# Patient Record
Sex: MALE | Race: OTHER | NOT HISPANIC OR LATINO | Employment: FULL TIME | URBAN - METROPOLITAN AREA
[De-identification: names, ages, dates, MRNs, and addresses within clinical notes are randomized per-mention and may not be internally consistent; named-entity substitution may affect disease eponyms.]

---

## 2018-05-14 ENCOUNTER — HISTORICAL (OUTPATIENT)
Dept: ADMINISTRATIVE | Facility: HOSPITAL | Age: 33
End: 2018-05-14

## 2018-05-14 LAB
ABS NEUT (OLG): 3.5
ALBUMIN SERPL-MCNC: 4.8 GM/DL (ref 3.4–5)
ALBUMIN/GLOB SERPL: 2 {RATIO} (ref 1.5–2.5)
ALP SERPL-CCNC: 68 UNIT/L (ref 38–126)
ALT SERPL-CCNC: 22 UNIT/L (ref 7–52)
AST SERPL-CCNC: 24 UNIT/L (ref 15–37)
BILIRUB SERPL-MCNC: 1 MG/DL (ref 0.2–1)
BILIRUBIN DIRECT+TOT PNL SERPL-MCNC: 0.2 MG/DL (ref 0–0.5)
BILIRUBIN DIRECT+TOT PNL SERPL-MCNC: 0.8 MG/DL
BUN SERPL-MCNC: 11 MG/DL (ref 7–18)
CALCIUM SERPL-MCNC: 9.2 MG/DL (ref 8.5–10)
CHLORIDE SERPL-SCNC: 101 MMOL/L (ref 98–107)
CHOLEST SERPL-MCNC: 203 MG/DL (ref 0–200)
CHOLEST/HDLC SERPL: 6.8 {RATIO}
CO2 SERPL-SCNC: 27 MMOL/L (ref 21–32)
CREAT SERPL-MCNC: 0.88 MG/DL (ref 0.6–1.3)
ERYTHROCYTE [DISTWIDTH] IN BLOOD BY AUTOMATED COUNT: 13.4 % (ref 11.5–17)
GLOBULIN SER-MCNC: 2.4 GM/DL (ref 1.2–3)
GLUCOSE SERPL-MCNC: 88 MG/DL (ref 74–106)
HCT VFR BLD AUTO: 43 % (ref 42–52)
HDLC SERPL-MCNC: 30 MG/DL (ref 35–60)
HGB BLD-MCNC: 15 GM/DL (ref 14–18)
LDLC SERPL CALC-MCNC: 131 MG/DL (ref 0–129)
LYMPHOCYTES # BLD AUTO: 2.9 X10(3)/MCL (ref 0.6–3.4)
LYMPHOCYTES NFR BLD AUTO: 41.2 % (ref 13–40)
MCH RBC QN AUTO: 30.1 PG (ref 27–31.2)
MCHC RBC AUTO-ENTMCNC: 35 GM/DL (ref 32–36)
MCV RBC AUTO: 86 FL (ref 80–94)
MONOCYTES # BLD AUTO: 0.6 X10(3)/MCL (ref 0–1.8)
MONOCYTES NFR BLD AUTO: 8.7 % (ref 0.1–24)
NEUTROPHILS NFR BLD AUTO: 50.1 % (ref 47–80)
PLATELET # BLD AUTO: 236 X10(3)/MCL (ref 130–400)
PMV BLD AUTO: 11.2 FL
POTASSIUM SERPL-SCNC: 4.4 MMOL/L (ref 3.5–5.1)
PROT SERPL-MCNC: 7.2 GM/DL (ref 6.4–8.2)
RBC # BLD AUTO: 4.98 X10(6)/MCL (ref 4.7–6.1)
SODIUM SERPL-SCNC: 135 MMOL/L (ref 136–145)
TRIGL SERPL-MCNC: 224 MG/DL (ref 30–150)
VLDLC SERPL CALC-MCNC: 44.8 MG/DL
WBC # SPEC AUTO: 7 X10(3)/MCL (ref 4.5–11.5)

## 2019-05-20 ENCOUNTER — HISTORICAL (OUTPATIENT)
Dept: ADMINISTRATIVE | Facility: HOSPITAL | Age: 34
End: 2019-05-20

## 2019-05-20 LAB
ABS NEUT (OLG): 3.1 X10(3)/MCL (ref 2.1–9.2)
ALBUMIN SERPL-MCNC: 4.4 GM/DL (ref 3.4–5)
ALBUMIN/GLOB SERPL: 2.1 {RATIO} (ref 1.5–2.5)
ALP SERPL-CCNC: 69 UNIT/L (ref 38–126)
ALT SERPL-CCNC: 29 UNIT/L (ref 7–52)
AST SERPL-CCNC: 22 UNIT/L (ref 15–37)
BILIRUB SERPL-MCNC: 0.6 MG/DL (ref 0.2–1)
BILIRUBIN DIRECT+TOT PNL SERPL-MCNC: 0.1 MG/DL (ref 0–0.5)
BILIRUBIN DIRECT+TOT PNL SERPL-MCNC: 0.5 MG/DL
BUN SERPL-MCNC: 9 MG/DL (ref 7–18)
CALCIUM SERPL-MCNC: 9 MG/DL (ref 8.5–10)
CHLORIDE SERPL-SCNC: 103 MMOL/L (ref 98–107)
CHOLEST SERPL-MCNC: 198 MG/DL (ref 0–200)
CHOLEST/HDLC SERPL: 6.6 {RATIO}
CO2 SERPL-SCNC: 24 MMOL/L (ref 21–32)
CREAT SERPL-MCNC: 0.8 MG/DL (ref 0.6–1.3)
DEPRECATED CALCIDIOL+CALCIFEROL SERPL-MC: 14.1 NG/ML (ref 30–80)
ERYTHROCYTE [DISTWIDTH] IN BLOOD BY AUTOMATED COUNT: 13.2 % (ref 11.5–17)
GLOBULIN SER-MCNC: 2.1 GM/DL (ref 1.2–3)
GLUCOSE SERPL-MCNC: 89 MG/DL (ref 74–106)
HCT VFR BLD AUTO: 42.7 % (ref 42–52)
HDLC SERPL-MCNC: 30 MG/DL (ref 35–60)
HGB BLD-MCNC: 15 GM/DL (ref 14–18)
LDLC SERPL CALC-MCNC: 135 MG/DL (ref 0–129)
LYMPHOCYTES # BLD AUTO: 2.2 X10(3)/MCL (ref 0.6–3.4)
LYMPHOCYTES NFR BLD AUTO: 36.5 % (ref 13–40)
MCH RBC QN AUTO: 29.6 PG (ref 27–31.2)
MCHC RBC AUTO-ENTMCNC: 35 GM/DL (ref 32–36)
MCV RBC AUTO: 84 FL (ref 80–94)
MONOCYTES # BLD AUTO: 0.6 X10(3)/MCL (ref 0.1–1.3)
MONOCYTES NFR BLD AUTO: 10.5 % (ref 0.1–24)
NEUTROPHILS NFR BLD AUTO: 53 % (ref 47–80)
PLATELET # BLD AUTO: 215 X10(3)/MCL (ref 130–400)
PMV BLD AUTO: 12.3 FL (ref 9.4–12.4)
POTASSIUM SERPL-SCNC: 4.8 MMOL/L (ref 3.5–5.1)
PROT SERPL-MCNC: 6.5 GM/DL (ref 6.4–8.2)
RBC # BLD AUTO: 5.06 X10(6)/MCL (ref 4.7–6.1)
SODIUM SERPL-SCNC: 136 MMOL/L (ref 136–145)
TRIGL SERPL-MCNC: 205 MG/DL (ref 30–150)
VLDLC SERPL CALC-MCNC: 41 MG/DL
WBC # SPEC AUTO: 5.9 X10(3)/MCL (ref 4.5–11.5)

## 2020-06-30 ENCOUNTER — HISTORICAL (OUTPATIENT)
Dept: ADMINISTRATIVE | Facility: HOSPITAL | Age: 35
End: 2020-06-30

## 2020-06-30 LAB
ABS NEUT (OLG): 3.3 X10(3)/MCL (ref 2.1–9.2)
ALBUMIN SERPL-MCNC: 4.8 GM/DL (ref 3.4–5)
ALBUMIN/GLOB SERPL: 1.85 {RATIO} (ref 1.5–2.5)
ALP SERPL-CCNC: 72 UNIT/L (ref 38–126)
ALT SERPL-CCNC: 24 UNIT/L (ref 7–52)
AST SERPL-CCNC: 26 UNIT/L (ref 15–37)
BILIRUB SERPL-MCNC: 0.8 MG/DL (ref 0.2–1)
BILIRUBIN DIRECT+TOT PNL SERPL-MCNC: 0.1 MG/DL (ref 0–0.5)
BILIRUBIN DIRECT+TOT PNL SERPL-MCNC: 0.7 MG/DL
BUN SERPL-MCNC: 9 MG/DL (ref 7–18)
CALCIUM SERPL-MCNC: 9.6 MG/DL (ref 8.5–10)
CHLORIDE SERPL-SCNC: 101 MMOL/L (ref 98–107)
CHOLEST SERPL-MCNC: 216 MG/DL (ref 0–200)
CHOLEST/HDLC SERPL: 7.7 {RATIO}
CO2 SERPL-SCNC: 28 MMOL/L (ref 21–32)
CREAT SERPL-MCNC: 0.89 MG/DL (ref 0.6–1.3)
DEPRECATED CALCIDIOL+CALCIFEROL SERPL-MC: 12.4 NG/ML (ref 30–80)
ERYTHROCYTE [DISTWIDTH] IN BLOOD BY AUTOMATED COUNT: 13.4 % (ref 11.5–17)
GLOBULIN SER-MCNC: 2.6 GM/DL (ref 1.2–3)
GLUCOSE SERPL-MCNC: 83 MG/DL (ref 74–106)
HCT VFR BLD AUTO: 42.3 % (ref 42–52)
HDLC SERPL-MCNC: 28 MG/DL (ref 35–60)
HGB BLD-MCNC: 14.7 GM/DL (ref 14–18)
LDLC SERPL CALC-MCNC: 143 MG/DL (ref 0–129)
LYMPHOCYTES # BLD AUTO: 2.5 X10(3)/MCL (ref 0.6–3.4)
LYMPHOCYTES NFR BLD AUTO: 38.7 % (ref 13–40)
MCH RBC QN AUTO: 29.8 PG (ref 27–31.2)
MCHC RBC AUTO-ENTMCNC: 35 GM/DL (ref 32–36)
MCV RBC AUTO: 86 FL (ref 80–94)
MONOCYTES # BLD AUTO: 0.6 X10(3)/MCL (ref 0.1–1.3)
MONOCYTES NFR BLD AUTO: 9.1 % (ref 0.1–24)
NEUTROPHILS NFR BLD AUTO: 52.2 % (ref 47–80)
PLATELET # BLD AUTO: 243 X10(3)/MCL (ref 130–400)
PMV BLD AUTO: 12 FL (ref 9.4–12.4)
POTASSIUM SERPL-SCNC: 4.6 MMOL/L (ref 3.5–5.1)
PROT SERPL-MCNC: 7.4 GM/DL (ref 6.4–8.2)
RBC # BLD AUTO: 4.93 X10(6)/MCL (ref 4.7–6.1)
SODIUM SERPL-SCNC: 137 MMOL/L (ref 136–145)
TRIGL SERPL-MCNC: 218 MG/DL (ref 30–150)
VIT B12 SERPL-MCNC: 273 PG/ML (ref 213–816)
VLDLC SERPL CALC-MCNC: 43.6 MG/DL
WBC # SPEC AUTO: 6.4 X10(3)/MCL (ref 4.5–11.5)

## 2021-07-08 ENCOUNTER — HISTORICAL (OUTPATIENT)
Dept: ADMINISTRATIVE | Facility: HOSPITAL | Age: 36
End: 2021-07-08

## 2021-07-08 LAB
ABS NEUT (OLG): 3.4 X10(3)/MCL (ref 2.1–9.2)
ALBUMIN SERPL-MCNC: 4.7 GM/DL (ref 3.4–5)
ALBUMIN/GLOB SERPL: 1.96 {RATIO} (ref 1.5–2.5)
ALP SERPL-CCNC: 80 UNIT/L (ref 38–126)
ALT SERPL-CCNC: 16 UNIT/L (ref 7–52)
AST SERPL-CCNC: 19 UNIT/L (ref 15–37)
BILIRUB SERPL-MCNC: 0.8 MG/DL (ref 0.2–1)
BILIRUBIN DIRECT+TOT PNL SERPL-MCNC: 0.2 MG/DL (ref 0–0.5)
BILIRUBIN DIRECT+TOT PNL SERPL-MCNC: 0.6 MG/DL
BUN SERPL-MCNC: 11 MG/DL (ref 7–18)
CALCIUM SERPL-MCNC: 9.3 MG/DL (ref 8.5–10.1)
CHLORIDE SERPL-SCNC: 102 MMOL/L (ref 98–107)
CHOLEST SERPL-MCNC: 206 MG/DL (ref 0–200)
CHOLEST/HDLC SERPL: 6.9 {RATIO}
CO2 SERPL-SCNC: 25 MMOL/L (ref 21–32)
CREAT SERPL-MCNC: 0.86 MG/DL (ref 0.6–1.3)
DEPRECATED CALCIDIOL+CALCIFEROL SERPL-MC: 11.6 NG/ML (ref 30–80)
ERYTHROCYTE [DISTWIDTH] IN BLOOD BY AUTOMATED COUNT: 13.9 % (ref 11.5–17)
GLOBULIN SER-MCNC: 2.4 GM/DL (ref 1.2–3)
GLUCOSE SERPL-MCNC: 90 MG/DL (ref 74–106)
HCT VFR BLD AUTO: 41.2 % (ref 42–52)
HDLC SERPL-MCNC: 30 MG/DL (ref 35–60)
HGB BLD-MCNC: 14.3 GM/DL (ref 14–18)
LDLC SERPL CALC-MCNC: 144 MG/DL (ref 0–129)
LYMPHOCYTES # BLD AUTO: 2.3 X10(3)/MCL (ref 0.6–3.4)
LYMPHOCYTES NFR BLD AUTO: 36.3 % (ref 13–40)
MCH RBC QN AUTO: 29.1 PG (ref 27–31.2)
MCHC RBC AUTO-ENTMCNC: 35 GM/DL (ref 32–36)
MCV RBC AUTO: 84 FL (ref 80–94)
MONOCYTES # BLD AUTO: 0.6 X10(3)/MCL (ref 0.1–1.3)
MONOCYTES NFR BLD AUTO: 9.8 % (ref 0.1–24)
NEUTROPHILS NFR BLD AUTO: 53.9 % (ref 47–80)
PLATELET # BLD AUTO: 246 X10(3)/MCL (ref 130–400)
PMV BLD AUTO: 11.6 FL (ref 9.4–12.4)
POTASSIUM SERPL-SCNC: 4.2 MMOL/L (ref 3.5–5.1)
PROT SERPL-MCNC: 7.1 GM/DL (ref 6.4–8.2)
RBC # BLD AUTO: 4.92 X10(6)/MCL (ref 4.7–6.1)
SODIUM SERPL-SCNC: 137 MMOL/L (ref 136–145)
TRIGL SERPL-MCNC: 166 MG/DL (ref 30–150)
VIT B12 SERPL-MCNC: 384 PG/ML (ref 213–816)
VLDLC SERPL CALC-MCNC: 33.2 MG/DL
WBC # SPEC AUTO: 6.3 X10(3)/MCL (ref 4.5–11.5)

## 2022-04-10 ENCOUNTER — HISTORICAL (OUTPATIENT)
Dept: ADMINISTRATIVE | Facility: HOSPITAL | Age: 37
End: 2022-04-10

## 2022-04-11 ENCOUNTER — HISTORICAL (OUTPATIENT)
Dept: ADMINISTRATIVE | Facility: HOSPITAL | Age: 37
End: 2022-04-11

## 2022-04-28 VITALS
SYSTOLIC BLOOD PRESSURE: 110 MMHG | WEIGHT: 151 LBS | BODY MASS INDEX: 25.16 KG/M2 | HEIGHT: 65 IN | DIASTOLIC BLOOD PRESSURE: 72 MMHG

## 2022-04-28 VITALS
HEIGHT: 65 IN | DIASTOLIC BLOOD PRESSURE: 72 MMHG | WEIGHT: 151 LBS | SYSTOLIC BLOOD PRESSURE: 110 MMHG | BODY MASS INDEX: 25.16 KG/M2

## 2022-05-03 NOTE — HISTORICAL [facility]ER
This is a historical note converted from Jessie. Formatting and pictures may have been removed.  Please reference Jessie for original formatting and attached multimedia. Chief Complaint  cpx fasting  History of Present Illness  34 year old male presents for wellness visit.  Blood Pressure?- 96/66  BMI?- 25.68  Immunizations?- up to date  Diet - Traditional  Diet  Exercise?- Cardio intermittently  Hypothyroidism - managed by endocrinologist.? Sees Dr. Andujar.?  Patient reports?history of?vitamin D deficiency.  Right anterior knee pain?inferior patella.? Pain associated with?going up or down stairs, walking?and?with?knee extension.? Denies any trauma or injury. ?Denies any?edema.  Review of Systems  Constitutional:?No weight loss, no fever, no fatigue, no chills, no night sweats,?no weakness  Eyes:?No blurred vision,?no redness,?no drainage,?no ocular?pain  HEENT:?No sore throat,?no ear pain, no sinus pressure, no nasal congestion, no rhinorrhea, no postnasal drip  Respiratory:?No cough, no wheezing, no sputum production, no shortness of breath  Cardiovascular:?No chest pain, no palpitations, no dyspnea on exertion,?no orthopnea  Gastrointestinal:?No nausea, no vomiting, no abdominal pain, no diarrhea,?no constipation, no melena,?no hematochezia  Genitourinary:?No dysuria, no hematuria, no frequency, no urgency, no incontinence, no discharge  Musculoskeletal:?No myalgias,? arthralgias, no weakness, no joint effusion, no edema  Integumentary:?No rashes, no hives, no itching, no lesions, no jaundice  Neurologic:?No headaches, no numbness, no tingling, no weakness, no dizziness  Psychiatric:?No anxiety, no irritability, no depression,?no suicidal ideations, no?homicidal ideations,?no delusions, no hallucinations  Endocrine:?No polyuria, no polydipsia, no polyphagia  Hematology:?No bruising, no lymphadenopathy,?no paleness  ?  Physical Exam  Vitals & Measurements  HR:?70(Peripheral)? BP:?96/66?  HT:?165.1?cm?  WT:?70?kg? BMI:?25.68?  General:?Well developed, Well-nourished, in No acute distress, A&O x 4  Eye:?PERRLA, EOMI, Clear conjunctiva, Eyelids unremarkable  Ears:?Bilateral EAC clear?and?Bilateral TM clear  Nose:? Mucosa normal, No rhinorrhea, No lesions  O/P:??No?erythema,?No tonsillar hypertrophy,?No tonsillar exudates,?No cobblestoning  Neck:?Soft, Nontender, No thyromegaly, Full range of motion, No cervical lymphadenopathy, No JVD  Cardiovascular:?Regular rate and rhythm, No murmurs, No gallops, No rubs  Respiratory:?Clear to auscultation bilaterally, No wheezes, No rhonchi, No?crackles  Abdomen:? Soft, Nontender, No hepatosplenomegaly, Normal and equal bowel sounds, No masses, No rebound, No guarding  Musculoskeletal:?Right inferior patella tenderness, FROM, No joint abnormality, No clubbing, No cyanosis,No?edema  Neurologic:? No motor or sensory deficits, Reflexes 2+ throughout, CN II-XII grossly intact  Integumentary:?No rashes or skin lesions  ?  Assessment/Plan  1.?Wellness examination?Z00.00  ?Discussed the?importance of maintaining a balanced diet and regular exercise  Ordered:  CBC w/ Auto Diff, Routine collect, 05/20/19 8:03:00 CDT, Blood, Order for future visit, Stop date 05/20/19 8:03:00 CDT, Lab Collect, Wellness examination, 05/20/19 8:03:00 CDT  Comprehensive Metabolic Panel, Routine collect, 05/20/19 8:03:00 CDT, Blood, Order for future visit, Stop date 05/20/19 8:03:00 CDT, Lab Collect, Wellness examination, 05/20/19 8:03:00 CDT  Lab Collection Request, 05/20/19 8:03:00 CDT, [facility], 05/20/19 8:03:00 CDT  Lipid Panel, Routine collect, 05/20/19 8:03:00 CDT, Blood, Order for future visit, Stop date 05/20/19 8:03:00 CDT, Lab Collect, Wellness examination, 05/20/19 8:03:00 CDT  Preventative Health Care Est 18-39 years [id number] PC, [facility] 05/20/19 8:03:00 CDT  ?  2.?Patellar tendonitis?M76.50  ?Range of motion exercises?given to patient  Meloxicam 15 mg daily  If  symptoms fail to improve?recommend physical therapy  ?  3.?Hypothyroidism?E03.9  ?Follow-up with endocrinology  ?  4.?Vitamin D deficiency?E55.9  Ordered:  Vitamin D, 25-Hydroxy Level, Routine collect, 05/20/19 8:03:00 CDT, Blood, Order for future visit, Stop date 05/20/19 8:03:00 CDT, Lab Collect, Vitamin D deficiency, 05/20/19 8:03:00 CDT  ?  Orders:  meloxicam, 15 mg = 1 tab(s), Oral, Daily, # 30 tab(s), 0 Refill(s), Pharmacy: [facility] [id number]   Problem List/Past Medical History  Ongoing  Hypothyroidism  Historical  No qualifying data  Medications  levothyroxine 112 mcg (0.112 mg) oral tablet, 112 mcg= 1 tab(s), Oral, qAM  Mobic 15 mg oral tablet, 15 mg= 1 tab(s), Oral, Daily  Allergies  No Known Medication Allergies  Social History  Alcohol  Current, 1-2 times per week, 05/14/2018  Employment/School  Employed, Work/School description: ., 05/14/2018  Tobacco  Never (less than 100 in lifetime), N/A, 05/20/2019  Never smoker Use:., 05/14/2018  Family History  Family history is negative  Immunizations  Vaccine Date Status   influenza virus vaccine, inactivated 10/26/2018 Recorded   influenza virus vaccine, inactivated 10/25/2016 Recorded   Health Maintenance  Health Maintenance  ???Pending?(in the next year)  ??? ??OverDue  ??? ? ? ?Diabetes Screening due??and every?  ??? ? ? ?Alcohol Misuse Screening due??01/01/19??and every 1??year(s)  ??? ??Due?  ??? ? ? ?ADL Screening due??05/20/19??and every 1??year(s)  ??? ? ? ?Depression Screening due??05/20/19??and every?  ??? ? ? ?Tetanus Vaccine due??05/20/19??and every 10??year(s)  ??? ??Due In Future?  ??? ? ? ?Obesity Screening not due until??01/01/20??and every 1??year(s)  ??? ? ? ?Blood Pressure Screening not due until??05/19/20??and every 1??year(s)  ??? ? ? ?Body Mass Index Check not due until??05/19/20??and every 1??year(s)  ???Satisfied?(in the past 1 year)  ??? ??Satisfied?  ??? ? ? ?Blood Pressure Screening on??05/20/19.??Satisfied by  Zhanna Jacobo LPN  ??? ? ? ?Body Mass Index Check on??05/20/19.??Satisfied by Zhanna Jacobo LPN  ??? ? ? ?Influenza Vaccine on??05/20/19.??Satisfied by Zhanna Jacobo LPN  ??? ? ? ?Obesity Screening on??05/20/19.??Satisfied by Zhanna Jacobo LPN  ?  ?

## 2022-05-03 NOTE — HISTORICAL [facility]
This is a historical note converted from Jessie. Formatting and pictures may have been removed.  Please reference Jessie for original formatting and attached multimedia. Chief Complaint  cpx fasting  History of Present Illness  33 year old male presents for wellness visit.  Blood Pressure?104/62  BMI?25.53  Immunizations?up to date  Diet Traditional [place] Diet  Exercise?Cardio 2 x weekly  Reports left?anterior knee pain since being struck by a ball?during cricket. ?Pain with?knee extension?while doing weight training.? Denies any pain with running?or jumping.  Right upper?shoulder pain?when patient?throws a ball forcefully.? Relieved with stretching and rest.  Hypothyroidism. ?Managed by endocrinologist.  Patient reports left-sided?frontal headache?with?lack of sleep?one week ago. ?Resolve with rest.? Denies any?nausea,?vomiting,?phonophobia. ?Mild photophobia when?first started?although?not persistent.? Patient reports not having to take any medications for the headache.?  Review of Systems  Constitutional:?No weight loss, no fever, no fatigue, no chills, no night sweats,?no weakness  Eyes:?No blurred vision,?no redness,?no drainage,?no ocular?pain  HEENT:?No sore throat,?no ear pain, no sinus pressure, no nasal congestion, no rhinorrhea, no postnasal drip  Respiratory:?No cough, no wheezing, no sputum production, no shortness of breath  Cardiovascular:?No chest pain, no palpitations, no dyspnea on exertion,?no orthopnea  Gastrointestinal:?No nausea, no vomiting, no abdominal pain, no diarrhea,?no constipation, no melena,?no hematochezia  Genitourinary:?No dysuria, no hematuria, no frequency, no urgency, no incontinence, no discharge  Musculoskeletal:?myalgias, no arthralgias, no weakness, no joint effusion, no edema  Integumentary:?No rashes, no hives, no itching, no lesions, no jaundice  Neurologic:?No headaches, no numbness, no tingling, no weakness, no dizziness  Psychiatric:?No anxiety, no irritability, no  depression,?no suicidal ideations, no?homicidal ideations,?no delusions, no hallucinations  Endocrine:?No polyuria, no polydipsia, no polyphagia  Hematology:?No bruising, no lymphadenopathy,?no paleness  ?  Physical Exam  Vitals & Measurements  HR:?80(Peripheral)? BP:?104/62?  HT:?165.1?cm? HT:?165.1?cm? WT:?69.6?kg? WT:?69.6?kg? BMI:?25.53?  General:?Well developed, Well-nourished, in No acute distress, A&O x 4  Eye:?PERRLA, EOMI, Clear conjunctiva, Eyelids unremarkable  Ears:?Bilateral EAC clear?and?Bilateral TM clear  Nose:? Mucosa normal, No rhinorrhea, No lesions  O/P:??No?erythema,?No tonsillar hypertrophy,?No tonsillar exudates,?No cobblestoning  Neck:?Soft, Nontender, No thyromegaly, Full range of motion, No cervical lymphadenopathy, No JVD  Cardiovascular:?Regular rate and rhythm, No murmurs, No gallops, No rubs  Respiratory:?Clear to auscultation bilaterally, No wheezes, No rhonchi, No?crackles  Abdomen:? Soft, Nontender, No hepatosplenomegaly, Normal and equal bowel sounds, No masses, No rebound, No guarding  Musculoskeletal:?Mild?tenderness?over inferior pole?of left patella and insertion of patellar tendon?lateral aspect, FROM, No joint abnormality, No clubbing, No cyanosis,No?edema  Neurologic:? No motor or sensory deficits, Reflexes 2+ throughout, CN II-XII grossly intact  Integumentary:?No rashes or skin lesions  ?  Assessment/Plan  1.?Wellness examination  ?CBC, CMP, FLP?today  Discussed importance of maintaining balanced diet regular exercise  Ordered:  Preventative Health Care Est 18-39 years [id number] , Wellness examination, [facility], 05/14/18 10:46:00 CDT  ?  2.?Hypothyroidism  ?Patient currently taking levothyroxine 137 mcg daily  Follow-up with endocrinologist as scheduled  ?  3.?Patellar tendonitis  ?Range of motion exercises given to patient  If symptoms fail to improve consider physical therapy  ?  4.?Trapezius strain  ?Range of motion exercises given to patient  If symptoms fail to  improve consider physical therapy  ?  5.?Tension headache  ?If recurs patient to?treat as needed with NSAIDs  If symptoms?fail to improve?or?headaches become?persistent patient to follow-up?for further evaluation  ?   Problem List/Past Medical History  Ongoing  Hypothyroidism  Historical  No qualifying data  Medications  LEVOTHYROXINE 0.137MG (137MCG) TAB  Allergies  No Known Medication Allergies  Social History  Alcohol  Current, 1-2 times per week, 05/14/2018  Employment/School  Employed, Work/School description: ., 05/14/2018  Tobacco  Never smoker Use:., 05/14/2018  Family History  Family history is negative

## 2022-05-03 NOTE — HISTORICAL [facility]
This is a historical note converted from Jessie. Formatting and pictures may have been removed.  Please reference Jessie for original formatting and attached multimedia. Chief Complaint  CPX (fasting)  History of Present Illness  35 year old male presents for wellness visit.  Blood Pressure?- 108/70  BMI?- 26.41  Immunizations?- up to date  Diet - Traditional [place] Diet  Exercise?- Minimal  Hypothyroidism - managed by endocrinologist.? Sees Dr. Andujar.?Pt recently changed to brand name Synthroid 150 mcg daily 7 days ago.  History of?vitamin D deficiency.  Pt does report fatigue over the last year.?  Pt reports headache if he doesnt eat regularly.  Intermittent?left?anterior lateral?foot pain?with dorsiflexion.? Patient avoids?prolonged walking or running?when bothersome.? Some improvement with more regular use of tennis shoes  Review of Systems  Constitutional:?No weight loss, no fever, fatigue, no chills, no night sweats,?no weakness  Eyes:?No blurred vision,?no redness,?no drainage,?no ocular?pain  HEENT:?No sore throat,?no ear pain, no sinus pressure, no nasal congestion, no rhinorrhea, no postnasal drip  Respiratory:?No cough, no wheezing, no sputum production, no shortness of breath  Cardiovascular:?No chest pain, no palpitations, no dyspnea on exertion,?no orthopnea  Gastrointestinal:?No nausea, no vomiting, no abdominal pain, no diarrhea,?no constipation, no melena,?no hematochezia  Genitourinary:?No dysuria, no hematuria, no frequency, no urgency, no incontinence, no discharge  Musculoskeletal:?No myalgias,?arthralgias, no weakness, no joint effusion, no edema  Integumentary:?No rashes, no hives, no itching, lesion, no jaundice  Neurologic:?No headaches, no numbness, no tingling, no weakness, no dizziness  Psychiatric:?No anxiety, no irritability, no depression,?no suicidal ideations, no?homicidal ideations,?no delusions, no hallucinations  Endocrine:?No polyuria, no polydipsia, no  polyphagia  Hematology:?No bruising, no lymphadenopathy,?no paleness  ?  Physical Exam  Vitals & Measurements  T:?36.8? ?C (Oral)? HR:?70(Peripheral)? BP:?108/70?  HT:?165.1?cm? WT:?72.0?kg? BMI:?26.41?  General:?Well developed, Well-nourished, in No acute distress, A&O x 4  Eye:?PERRLA, EOMI, Clear conjunctiva, Eyelids unremarkable  Ears:?Bilateral EAC clear?and?Bilateral TM clear  Nose:? Mucosa normal, No rhinorrhea, No lesions  O/P:??No?erythema,?No tonsillar hypertrophy,?No tonsillar exudates,?No cobblestoning  Neck:?Soft, Nontender, No thyromegaly, Full range of motion, No cervical lymphadenopathy, No JVD  Cardiovascular:?Regular rate and rhythm, No murmurs, No gallops, No rubs  Respiratory:?Clear to auscultation bilaterally, No wheezes, No rhonchi, No?crackles  Abdomen:? Soft, Nontender, No hepatosplenomegaly, Normal and equal bowel sounds, No masses, No rebound, No guarding  Musculoskeletal:? No tenderness, FROM, No joint abnormality, No clubbing, No cyanosis,No?edema, Pt points to area over extensor digitorum tendon ?  Neurologic:? No motor or sensory deficits, Reflexes 2+ throughout, CN II-XII grossly intact  Integumentary:?scattered cherry angiomas and benign appearing nevi  ?  Assessment/Plan  1.?Wellness examination?Z00.00  ?Discussed the?importance of maintaining a balanced diet and regular exercise  Ordered:  CBC w/ Auto Diff, Routine collect, 06/30/20 11:02:00 CDT, Blood, Order for future visit, Stop date 06/30/20 11:02:00 CDT, Lab Collect, Wellness examination, 06/30/20 11:02:00 CDT  Comprehensive Metabolic Panel, Routine collect, 06/30/20 11:02:00 CDT, Blood, Order for future visit, Stop date 06/30/20 11:02:00 CDT, Lab Collect, Wellness examination, 06/30/20 11:02:00 CDT  Lab Collection Request, 06/30/20 11:02:00 CDT, [facility], 06/30/20 11:02:00 CDT, Wellness examination  Lipid Panel, Routine collect, 06/30/20 11:02:00 CDT, Blood, Order for future visit, Stop date 06/30/20 11:02:00 CDT, Lab  Collect, Wellness examination, 06/30/20 11:02:00 CDT  Preventative Health Care Est 18-39 years [id number] PC, Wellness examination, [facility], 06/30/20 11:02:00 CDT  ?  2.?Fatigue?R53.83  ?CBC, CMP, vitamin D, B12  Dosing of Synthroid recently changed. ?Follow-up with endocrinology as scheduled  Ordered:  Vitamin B12 Level, Routine collect, 06/30/20 11:02:00 CDT, Blood, Order for future visit, Stop date 06/30/20 11:02:00 CDT, Lab Collect, Fatigue, 06/30/20 11:02:00 CDT  ?  3.?Vitamin D deficiency?E55.9  Ordered:  Vitamin D, 25-Hydroxy Level, Routine collect, 06/30/20 11:02:00 CDT, Blood, Order for future visit, Stop date 06/30/20 11:02:00 CDT, Lab Collect, Vitamin D deficiency, 06/30/20 11:02:00 CDT  ?  4.?Cherry angioma?D18.01  ?Reassurance and observation  ?  5.?Nevus?D22.9  ?Reassurance and observation  ?  6.?Strain of extensor digitorum tendon?S56.419A  ?Recommend supportive?footwear and range of motion exercises-handout given to patient  ?  Referrals  Clinic Follow up, Order for future visit   Problem List/Past Medical History  Ongoing  Hypothyroidism  Historical  No qualifying data  Medications  Synthroid 150 mcg (0.15 mg) oral tablet, 150 mcg= 1 tab(s), Oral, Daily  Allergies  No Known Medication Allergies  Social History  Abuse/Neglect  No, 06/30/2020  Alcohol  Current, 1-2 times per week, 05/14/2018  Employment/School  Employed, Work/School description: ., 05/14/2018  Tobacco  Never (less than 100 in lifetime), N/A, 06/30/2020  Never (less than 100 in lifetime), N/A, 05/20/2019  Never smoker Use:., 05/14/2018  Family History  Family history is negative  Immunizations  Vaccine Date Status   influenza virus vaccine, inactivated 10/28/2019 Recorded   influenza virus vaccine, inactivated 10/26/2018 Recorded   influenza virus vaccine, inactivated 10/25/2016 Recorded   Health Maintenance  Health Maintenance  ???Pending?(in the next year)  ??? ??OverDue  ??? ? ? ?Diabetes Screening due??and  every?  ??? ? ? ?Alcohol Misuse Screening due??01/01/20??and every 1??year(s)  ??? ??Due?  ??? ? ? ?ADL Screening due??06/30/20??and every 1??year(s)  ??? ? ? ?Depression Screening due??06/30/20??and every?  ??? ? ? ?Tetanus Vaccine due??06/30/20??and every 10??year(s)  ??? ??Due In Future?  ??? ? ? ?Obesity Screening not due until??01/01/21??and every 1??year(s)  ???Satisfied?(in the past 1 year)  ??? ??Satisfied?  ??? ? ? ?Blood Pressure Screening on??06/30/20.??Satisfied by Ivy Cool CMA  ??? ? ? ?Body Mass Index Check on??06/30/20.??Satisfied by Ivy Cool CMA  ??? ? ? ?Influenza Vaccine on??10/28/19.??Satisfied by Ray Guerrero Jr, MD  ??? ? ? ?Obesity Screening on??06/30/20.??Satisfied by Ivy Cool CMA  ?

## 2022-07-12 PROBLEM — E53.8 COBALAMIN DEFICIENCY: Status: ACTIVE | Noted: 2022-07-12

## 2022-07-12 PROBLEM — E03.9 HYPOTHYROIDISM: Status: ACTIVE | Noted: 2022-07-12

## 2022-07-12 PROBLEM — E55.9 VITAMIN D DEFICIENCY: Status: ACTIVE | Noted: 2022-07-12

## 2022-07-12 PROCEDURE — 82607 VITAMIN B-12: CPT | Performed by: FAMILY MEDICINE

## 2023-07-13 PROCEDURE — 82607 VITAMIN B-12: CPT | Performed by: FAMILY MEDICINE

## 2024-07-23 PROBLEM — Z00.00 WELLNESS EXAMINATION: Status: ACTIVE | Noted: 2024-07-23

## 2024-07-24 PROCEDURE — 82607 VITAMIN B-12: CPT | Performed by: NURSE PRACTITIONER

## 2024-10-28 PROBLEM — Z00.00 WELLNESS EXAMINATION: Status: RESOLVED | Noted: 2024-07-23 | Resolved: 2024-10-28

## 2025-07-30 PROCEDURE — 82607 VITAMIN B-12: CPT | Performed by: FAMILY MEDICINE

## 2025-07-30 PROCEDURE — 82746 ASSAY OF FOLIC ACID SERUM: CPT | Performed by: FAMILY MEDICINE
